# Patient Record
Sex: FEMALE | NOT HISPANIC OR LATINO | URBAN - METROPOLITAN AREA
[De-identification: names, ages, dates, MRNs, and addresses within clinical notes are randomized per-mention and may not be internally consistent; named-entity substitution may affect disease eponyms.]

---

## 2017-03-30 ENCOUNTER — IMPORTED ENCOUNTER (OUTPATIENT)
Dept: URBAN - METROPOLITAN AREA CLINIC 1 | Facility: CLINIC | Age: 82
End: 2017-03-30

## 2017-03-30 PROBLEM — H40.013: Noted: 2017-03-30

## 2017-03-30 PROBLEM — D35.2: Noted: 2017-03-30

## 2017-03-30 PROCEDURE — 92012 INTRM OPH EXAM EST PATIENT: CPT

## 2017-03-30 PROCEDURE — 92083 EXTENDED VISUAL FIELD XM: CPT

## 2017-03-30 NOTE — PATIENT DISCUSSION
1.  Pituitary Adenoma Tumor OU- Essentially normal HVF OU. Observe. 2.  Glaucoma Suspect OU (0.8 OU): Stable IOP OU. No progression by VF OU. Patient is considered high risk. 3. STANISLAW w/ PEK OU-  Cont ATs TID OU Routinely. 4.  Pseudophakia OU- H/o YAG OU. Return for an appointment in 6 mo 30 OCT with Dr. Tana Monterroso.

## 2022-04-02 ASSESSMENT — VISUAL ACUITY
OS_SC: 20/60
OD_SC: 20/25

## 2022-04-02 ASSESSMENT — TONOMETRY
OS_IOP_MMHG: 14
OD_IOP_MMHG: 14